# Patient Record
(demographics unavailable — no encounter records)

---

## 2025-05-23 NOTE — PHYSICAL EXAM
[No Acute Distress] : no acute distress [Well-Appearing] : well-appearing [Normal Sclera/Conjunctiva] : normal sclera/conjunctiva [PERRL] : pupils equal round and reactive to light [EOMI] : extraocular movements intact [Normal Outer Ear/Nose] : the outer ears and nose were normal in appearance [Normal Oropharynx] : the oropharynx was normal [Normal TMs] : both tympanic membranes were normal [No Lymphadenopathy] : no lymphadenopathy [Supple] : supple [Thyroid Normal, No Nodules] : the thyroid was normal and there were no nodules present [No Respiratory Distress] : no respiratory distress  [Clear to Auscultation] : lungs were clear to auscultation bilaterally [Normal Rate] : normal rate  [Regular Rhythm] : with a regular rhythm [Normal S1, S2] : normal S1 and S2 [No Murmur] : no murmur heard [No Edema] : there was no peripheral edema [Soft] : abdomen soft [Non Tender] : non-tender [Non-distended] : non-distended [No Masses] : no abdominal mass palpated [No HSM] : no HSM [Normal Bowel Sounds] : normal bowel sounds [Normal Supraclavicular Nodes] : no supraclavicular lymphadenopathy [Normal Posterior Cervical Nodes] : no posterior cervical lymphadenopathy [Normal Anterior Cervical Nodes] : no anterior cervical lymphadenopathy [No Focal Deficits] : no focal deficits [Normal Gait] : normal gait [Normal Affect] : the affect was normal [Normal Mood] : the mood was normal [Normal Insight/Judgement] : insight and judgment were intact [de-identified] : Bilateral ear canal with erythema, edema, tenderness on exam and mild debrits.  [de-identified] : Low mood

## 2025-05-23 NOTE — HEALTH RISK ASSESSMENT
[Yes] : In the past 12 months have you used drugs other than those required for medical reasons? Yes [2] : 2) Feeling down, depressed, or hopeless for more than half of the days (2) [PHQ-2 Positive] : PHQ-2 Positive [None] : None [With Family] : lives with family [# of Members in Household ___] :  household currently consist of [unfilled] member(s) [Unemployed] : unemployed [] :  [# Of Children ___] : has [unfilled] children [Sexually Active] : sexually active [Feels Safe at Home] : Feels safe at home [Never] : Never [de-identified] : h/o alcohol use disorder, re-start outpatient detox.  [de-identified] : cocaine, in detox program outpatient.  [de-identified] : active, not exercising.  [de-identified] : poor vegetable intake, high carbs.  [[id number]] : 4 [Change in mental status noted] : No change in mental status noted [Reports changes in hearing] : Reports no changes in hearing [Reports changes in vision] : Reports no changes in vision [Reports changes in dental health] : Reports no changes in dental health [de-identified] : uses glasses, recent exam.  [Former] : Former [de-identified] : smoked for 10 years, quit more than 10 years ago.

## 2025-05-23 NOTE — HEALTH RISK ASSESSMENT
[Yes] : In the past 12 months have you used drugs other than those required for medical reasons? Yes [2] : 2) Feeling down, depressed, or hopeless for more than half of the days (2) [PHQ-2 Positive] : PHQ-2 Positive [None] : None [With Family] : lives with family [# of Members in Household ___] :  household currently consist of [unfilled] member(s) [Unemployed] : unemployed [] :  [# Of Children ___] : has [unfilled] children [Sexually Active] : sexually active [Feels Safe at Home] : Feels safe at home [Never] : Never [de-identified] : h/o alcohol use disorder, re-start outpatient detox.  [de-identified] : cocaine, in detox program outpatient.  [de-identified] : active, not exercising.  [de-identified] : poor vegetable intake, high carbs.  [[id number]] : 4 [Change in mental status noted] : No change in mental status noted [Reports changes in hearing] : Reports no changes in hearing [Reports changes in vision] : Reports no changes in vision [Reports changes in dental health] : Reports no changes in dental health [de-identified] : uses glasses, recent exam.  [Former] : Former [de-identified] : smoked for 10 years, quit more than 10 years ago.

## 2025-05-23 NOTE — HISTORY OF PRESENT ILLNESS
[FreeTextEntry1] : NPA/CPE [de-identified] : Pt is 36yo male with PMH of anxiety and depression, PSTD, h/o substance abuse, fatty liver, lumbar spine disease, obesity, who presents for CPE and establish care.  Pt was with URI last week, seen in the urgent care and received antibiotic which was completed. Pt c/o persistent night right ear pain, no discharge, no other remaining symptoms.  Pt has h/o alcohol use for 17 years; last detox was impatient completed one month ago and drunk once again last weekend.   h/o cocaine use for 6 years on and off. Last use past weekend after a month off.  h/o Cannabis use for 20 years. Pt had medication changes for psych h/o about a month ago, currently only on sertraline. Pt is on management for concomitant detox at [facility], tomorrow has appointment for possible medication adjustment. Pt has psych and therapist there.  Reports last weekend use occurred after family issues/lack of support from sister, parents.  Covid vaccinated.

## 2025-05-23 NOTE — PHYSICAL EXAM
[No Acute Distress] : no acute distress [Well-Appearing] : well-appearing [Normal Sclera/Conjunctiva] : normal sclera/conjunctiva [PERRL] : pupils equal round and reactive to light [EOMI] : extraocular movements intact [Normal Outer Ear/Nose] : the outer ears and nose were normal in appearance [Normal Oropharynx] : the oropharynx was normal [Normal TMs] : both tympanic membranes were normal [No Lymphadenopathy] : no lymphadenopathy [Supple] : supple [Thyroid Normal, No Nodules] : the thyroid was normal and there were no nodules present [No Respiratory Distress] : no respiratory distress  [Clear to Auscultation] : lungs were clear to auscultation bilaterally [Normal Rate] : normal rate  [Regular Rhythm] : with a regular rhythm [Normal S1, S2] : normal S1 and S2 [No Murmur] : no murmur heard [No Edema] : there was no peripheral edema [Soft] : abdomen soft [Non Tender] : non-tender [Non-distended] : non-distended [No Masses] : no abdominal mass palpated [No HSM] : no HSM [Normal Bowel Sounds] : normal bowel sounds [Normal Supraclavicular Nodes] : no supraclavicular lymphadenopathy [Normal Posterior Cervical Nodes] : no posterior cervical lymphadenopathy [Normal Anterior Cervical Nodes] : no anterior cervical lymphadenopathy [No Focal Deficits] : no focal deficits [Normal Gait] : normal gait [Normal Affect] : the affect was normal [Normal Mood] : the mood was normal [Normal Insight/Judgement] : insight and judgment were intact [de-identified] : Bilateral ear canal with erythema, edema, tenderness on exam and mild debrits.  [de-identified] : Low mood

## 2025-05-23 NOTE — ASSESSMENT
[Vaccines Reviewed] : Immunizations reviewed today. Please see immunization details in the vaccine log within the immunization flowsheet.  [FreeTextEntry1] : CPE Recommended to make appointment with dentist. Updated with eye exam Importance of exercise/increased physical activity and diet changes discussed. Labs ordered including STI screening, patient agrees

## 2025-05-23 NOTE — HISTORY OF PRESENT ILLNESS
[FreeTextEntry1] : NPA/CPE [de-identified] : Pt is 38yo male with PMH of anxiety and depression, PSTD, h/o substance abuse, fatty liver, lumbar spine disease, obesity, who presents for CPE and establish care.  Pt was with URI last week, seen in the urgent care and received antibiotic which was completed. Pt c/o persistent night right ear pain, no discharge, no other remaining symptoms.  Pt has h/o alcohol use for 17 years; last detox was impatient completed one month ago and drunk once again last weekend.   h/o cocaine use for 6 years on and off. Last use past weekend after a month off.  h/o Cannabis use for 20 years. Pt had medication changes for psych h/o about a month ago, currently only on sertraline. Pt is on management for concomitant detox at [facility], tomorrow has appointment for possible medication adjustment. Pt has psych and therapist there.  Reports last weekend use occurred after family issues/lack of support from sister, parents.  Covid vaccinated.